# Patient Record
(demographics unavailable — no encounter records)

---

## 2024-12-03 NOTE — HISTORY OF PRESENT ILLNESS
[FreeTextEntry1] : This 58 year old P2  LMP- 2021, presents for annual. 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 [Mammogramdate] : July 2024 [TextBox_19] : BIRAD 2 [PapSmeardate] : June 2023 [TextBox_31] : neg pap and HPV [BoneDensityDate] : July 2024 [TextBox_37] : osteopenia [TextBox_43] : per pt 4 years ago

## 2024-12-03 NOTE — PHYSICAL EXAM
[Chaperone Present] : A chaperone was present in the examining room during all aspects of the physical examination [Appropriately responsive] : appropriately responsive [Alert] : alert [No Acute Distress] : no acute distress [Soft] : soft [Non-tender] : non-tender [No Lesions] : no lesions [Oriented x3] : oriented x3 [Examination Of The Breasts] : a normal appearance [No Discharge] : no discharge [No Masses] : no breast masses were palpable [Labia Majora] : normal [Labia Minora] : normal [Atrophy] : atrophy [No Bleeding] : There was no active vaginal bleeding [Normal] : normal [Uterine Adnexae] : non-palpable [Tenderness] : nontender

## 2024-12-03 NOTE — PLAN
[FreeTextEntry1] : Pt to CB for breast imaging orders  Lifestyle Medicine handouts regarding whole food plant-based diet provided RTO prn or for annual